# Patient Record
Sex: FEMALE | Race: WHITE | Employment: STUDENT | URBAN - METROPOLITAN AREA
[De-identification: names, ages, dates, MRNs, and addresses within clinical notes are randomized per-mention and may not be internally consistent; named-entity substitution may affect disease eponyms.]

---

## 2021-06-30 ENCOUNTER — HOSPITAL ENCOUNTER (EMERGENCY)
Age: 21
Discharge: HOME | End: 2021-06-30
Attending: EMERGENCY MEDICINE
Payer: COMMERCIAL

## 2021-06-30 VITALS
RESPIRATION RATE: 18 BRPM | TEMPERATURE: 99.1 F | HEART RATE: 96 BPM | SYSTOLIC BLOOD PRESSURE: 152 MMHG | WEIGHT: 135.14 LBS | OXYGEN SATURATION: 99 % | DIASTOLIC BLOOD PRESSURE: 93 MMHG

## 2021-06-30 DIAGNOSIS — T74.21XA SEXUAL ASSAULT OF ADULT, INITIAL ENCOUNTER: Primary | ICD-10-CM

## 2021-06-30 LAB
CLUE CELLS VAG QL WET PREP: NORMAL
KOH PREP SPEC: NORMAL
RPR SER QL: NONREACTIVE
SERVICE CMNT-IMP: NORMAL
T VAGINALIS VAG QL WET PREP: NORMAL

## 2021-06-30 PROCEDURE — 87210 SMEAR WET MOUNT SALINE/INK: CPT

## 2021-06-30 PROCEDURE — 87491 CHLMYD TRACH DNA AMP PROBE: CPT

## 2021-06-30 PROCEDURE — 87110 CHLAMYDIA CULTURE: CPT

## 2021-06-30 PROCEDURE — 87070 CULTURE OTHR SPECIMN AEROBIC: CPT

## 2021-06-30 PROCEDURE — 99284 EMERGENCY DEPT VISIT MOD MDM: CPT

## 2021-06-30 PROCEDURE — 75810000275 HC EMERGENCY DEPT VISIT NO LEVEL OF CARE

## 2021-06-30 PROCEDURE — 36415 COLL VENOUS BLD VENIPUNCTURE: CPT

## 2021-06-30 PROCEDURE — 86592 SYPHILIS TEST NON-TREP QUAL: CPT

## 2021-06-30 PROCEDURE — 74011250637 HC RX REV CODE- 250/637: Performed by: EMERGENCY MEDICINE

## 2021-06-30 RX ORDER — METHYLPHENIDATE HYDROCHLORIDE 25 MG/1
25 CAPSULE, EXTENDED RELEASE ORAL
COMMUNITY

## 2021-06-30 RX ORDER — LAMOTRIGINE 300 MG/1
300 TABLET, EXTENDED RELEASE ORAL DAILY
COMMUNITY

## 2021-06-30 RX ORDER — AZITHROMYCIN 250 MG/1
2000 TABLET, FILM COATED ORAL ONCE
Status: COMPLETED | OUTPATIENT
Start: 2021-06-30 | End: 2021-06-30

## 2021-06-30 RX ORDER — DEXTROAMPHETAMINE SACCHARATE, AMPHETAMINE ASPARTATE, DEXTROAMPHETAMINE SULFATE AND AMPHETAMINE SULFATE 2.5; 2.5; 2.5; 2.5 MG/1; MG/1; MG/1; MG/1
10 TABLET ORAL
COMMUNITY

## 2021-06-30 RX ORDER — FLUOXETINE HYDROCHLORIDE 20 MG/1
20 CAPSULE ORAL DAILY
COMMUNITY

## 2021-06-30 RX ORDER — RISPERIDONE 0.5 MG/1
0.5 TABLET, FILM COATED ORAL AS NEEDED
COMMUNITY

## 2021-06-30 RX ADMIN — AZITHROMYCIN MONOHYDRATE 2000 MG: 250 TABLET ORAL at 15:04
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
## 2021-07-02 LAB
C TRACH RRNA SPEC QL NAA+PROBE: NEGATIVE
N GONORRHOEA RRNA SPEC QL NAA+PROBE: NEGATIVE
PLEASE NOTE:, 188601: NORMAL
SPECIMEN SOURCE: NORMAL

## 2021-07-03 LAB
BACTERIA SPEC CULT: NORMAL
BACTERIA SPEC CULT: NORMAL
SERVICE CMNT-IMP: NORMAL

## 2021-07-04 LAB
C TRACH SPEC QL CULT: NEGATIVE
SPECIMEN SOURCE: NORMAL